# Patient Record
Sex: FEMALE | Race: WHITE | NOT HISPANIC OR LATINO | URBAN - METROPOLITAN AREA
[De-identification: names, ages, dates, MRNs, and addresses within clinical notes are randomized per-mention and may not be internally consistent; named-entity substitution may affect disease eponyms.]

---

## 2017-09-22 ENCOUNTER — FOLLOW UP ESTABLISHED (OUTPATIENT)
Dept: URBAN - METROPOLITAN AREA CLINIC 30 | Facility: CLINIC | Age: 67
End: 2017-09-22
Payer: MEDICARE

## 2017-09-22 PROCEDURE — 92134 CPTRZ OPH DX IMG PST SGM RTA: CPT | Performed by: OPTOMETRIST

## 2017-09-22 PROCEDURE — 92014 COMPRE OPH EXAM EST PT 1/>: CPT | Performed by: OPTOMETRIST

## 2017-09-22 ASSESSMENT — INTRAOCULAR PRESSURE
OD: 16
OS: 16

## 2017-09-22 ASSESSMENT — KERATOMETRY
OD: 25.05
OS: 42.92

## 2017-09-22 ASSESSMENT — VISUAL ACUITY
OS: 20/25
OD: 20/40

## 2018-11-28 ENCOUNTER — RX CHECK (OUTPATIENT)
Dept: URBAN - METROPOLITAN AREA CLINIC 24 | Facility: CLINIC | Age: 68
End: 2018-11-28
Payer: MEDICARE

## 2018-11-28 DIAGNOSIS — H04.123 DRY EYE SYNDROME OF BILATERAL LACRIMAL GLANDS: ICD-10-CM

## 2018-11-28 PROCEDURE — 92004 COMPRE OPH EXAM NEW PT 1/>: CPT | Performed by: OPHTHALMOLOGY

## 2018-11-28 PROCEDURE — 92014 COMPRE OPH EXAM EST PT 1/>: CPT | Performed by: OPHTHALMOLOGY

## 2018-11-28 ASSESSMENT — VISUAL ACUITY
OD: 20/25
OS: 20/20

## 2018-11-28 ASSESSMENT — INTRAOCULAR PRESSURE
OS: 16
OD: 14

## 2018-11-28 ASSESSMENT — KERATOMETRY
OS: 42.98
OD: 44.30

## 2020-05-19 ENCOUNTER — FOLLOW UP ESTABLISHED (OUTPATIENT)
Dept: URBAN - METROPOLITAN AREA CLINIC 24 | Facility: CLINIC | Age: 70
End: 2020-05-19
Payer: MEDICARE

## 2020-05-19 DIAGNOSIS — Z98.890 OTHER SPECIFIED POSTPROCEDURAL STATES: ICD-10-CM

## 2020-05-19 PROCEDURE — 92134 CPTRZ OPH DX IMG PST SGM RTA: CPT | Performed by: OPTOMETRIST

## 2020-05-19 PROCEDURE — 92014 COMPRE OPH EXAM EST PT 1/>: CPT | Performed by: OPTOMETRIST

## 2020-05-19 ASSESSMENT — VISUAL ACUITY
OS: 20/20
OD: 20/30

## 2020-05-19 ASSESSMENT — KERATOMETRY
OD: 44.62
OS: 42.60

## 2020-05-19 ASSESSMENT — INTRAOCULAR PRESSURE
OS: 15
OD: 13

## 2020-06-01 ENCOUNTER — Encounter (OUTPATIENT)
Dept: URBAN - METROPOLITAN AREA CLINIC 24 | Facility: CLINIC | Age: 70
End: 2020-06-01
Payer: MEDICARE

## 2020-06-01 DIAGNOSIS — Z01.818 ENCOUNTER FOR OTHER PREPROCEDURAL EXAMINATION: Primary | ICD-10-CM

## 2020-06-01 PROCEDURE — 99213 OFFICE O/P EST LOW 20 MIN: CPT | Performed by: PHYSICIAN ASSISTANT

## 2020-06-01 RX ORDER — LEVOTHYROXINE SODIUM 88 UG/1
TABLET ORAL
Qty: 0 | Refills: 0 | Status: INACTIVE
Start: 2020-06-01 | End: 2021-11-04

## 2020-06-08 ENCOUNTER — NEW PATIENT (OUTPATIENT)
Dept: URBAN - METROPOLITAN AREA CLINIC 24 | Facility: CLINIC | Age: 70
End: 2020-06-08
Payer: MEDICARE

## 2020-06-08 DIAGNOSIS — H52.223 REGULAR ASTIGMATISM, BILATERAL: ICD-10-CM

## 2020-06-08 PROCEDURE — 92012 INTRM OPH EXAM EST PATIENT: CPT | Performed by: OPHTHALMOLOGY

## 2020-06-08 PROCEDURE — 92002 INTRM OPH EXAM NEW PATIENT: CPT | Performed by: OPHTHALMOLOGY

## 2020-06-15 ENCOUNTER — SURGERY (OUTPATIENT)
Dept: URBAN - METROPOLITAN AREA SURGERY 12 | Facility: SURGERY | Age: 70
End: 2020-06-15
Payer: MEDICARE

## 2020-06-15 PROCEDURE — 66984 XCAPSL CTRC RMVL W/O ECP: CPT | Performed by: OPHTHALMOLOGY

## 2020-06-16 ENCOUNTER — POST OP (OUTPATIENT)
Dept: URBAN - METROPOLITAN AREA CLINIC 24 | Facility: CLINIC | Age: 70
End: 2020-06-16

## 2020-06-16 PROCEDURE — 99024 POSTOP FOLLOW-UP VISIT: CPT | Performed by: OPTOMETRIST

## 2020-06-16 ASSESSMENT — INTRAOCULAR PRESSURE: OD: 12

## 2020-06-22 ENCOUNTER — POST OP (OUTPATIENT)
Dept: URBAN - METROPOLITAN AREA CLINIC 24 | Facility: CLINIC | Age: 70
End: 2020-06-22

## 2020-06-22 ENCOUNTER — Encounter (OUTPATIENT)
Dept: URBAN - METROPOLITAN AREA CLINIC 24 | Facility: CLINIC | Age: 70
End: 2020-06-22
Payer: MEDICARE

## 2020-06-22 DIAGNOSIS — H25.813 COMBINED FORMS OF AGE-RELATED CATARACT, BILATERAL: ICD-10-CM

## 2020-06-22 PROCEDURE — 99024 POSTOP FOLLOW-UP VISIT: CPT | Performed by: OPTOMETRIST

## 2020-06-22 PROCEDURE — 99213 OFFICE O/P EST LOW 20 MIN: CPT | Performed by: PHYSICIAN ASSISTANT

## 2020-06-22 ASSESSMENT — INTRAOCULAR PRESSURE
OS: 15
OS: 15
OD: 15
OD: 15

## 2020-06-22 ASSESSMENT — VISUAL ACUITY
OS: 20/20
OD: 20/20

## 2020-07-13 ENCOUNTER — SURGERY (OUTPATIENT)
Dept: URBAN - METROPOLITAN AREA SURGERY 12 | Facility: SURGERY | Age: 70
End: 2020-07-13
Payer: MEDICARE

## 2020-07-13 PROCEDURE — 66984 XCAPSL CTRC RMVL W/O ECP: CPT | Performed by: OPHTHALMOLOGY

## 2020-07-14 ENCOUNTER — POST OP (OUTPATIENT)
Dept: URBAN - METROPOLITAN AREA CLINIC 24 | Facility: CLINIC | Age: 70
End: 2020-07-14

## 2020-07-14 PROCEDURE — 99024 POSTOP FOLLOW-UP VISIT: CPT | Performed by: OPTOMETRIST

## 2020-07-14 ASSESSMENT — INTRAOCULAR PRESSURE: OS: 13

## 2020-07-22 ENCOUNTER — POST OP (OUTPATIENT)
Dept: URBAN - METROPOLITAN AREA CLINIC 24 | Facility: CLINIC | Age: 70
End: 2020-07-22
Payer: MEDICARE

## 2020-07-22 PROCEDURE — 99024 POSTOP FOLLOW-UP VISIT: CPT | Performed by: OPTOMETRIST

## 2020-07-22 ASSESSMENT — VISUAL ACUITY: OS: 20/20

## 2020-07-22 ASSESSMENT — INTRAOCULAR PRESSURE
OD: 15
OS: 13

## 2020-08-03 ENCOUNTER — POST OP (OUTPATIENT)
Dept: URBAN - METROPOLITAN AREA CLINIC 24 | Facility: CLINIC | Age: 70
End: 2020-08-03
Payer: MEDICARE

## 2020-08-03 DIAGNOSIS — Z96.1 PRESENCE OF INTRAOCULAR LENS: Primary | ICD-10-CM

## 2020-08-03 PROCEDURE — 99024 POSTOP FOLLOW-UP VISIT: CPT | Performed by: OPTOMETRIST

## 2020-08-03 ASSESSMENT — VISUAL ACUITY
OS: 20/20
OD: 20/20

## 2020-08-03 ASSESSMENT — INTRAOCULAR PRESSURE
OS: 13
OD: 14

## 2021-09-02 ENCOUNTER — OFFICE VISIT (OUTPATIENT)
Dept: URBAN - METROPOLITAN AREA CLINIC 24 | Facility: CLINIC | Age: 71
End: 2021-09-02
Payer: MEDICARE

## 2021-09-02 DIAGNOSIS — H02.831 DERMATOCHALASIS OF RIGHT UPPER EYELID: ICD-10-CM

## 2021-09-02 DIAGNOSIS — H02.834 DERMATOCHALASIS OF LEFT UPPER EYELID: ICD-10-CM

## 2021-09-02 DIAGNOSIS — H43.813 VITREOUS DEGENERATION, BILATERAL: Primary | ICD-10-CM

## 2021-09-02 DIAGNOSIS — H26.491 OTHER SECONDARY CATARACT, RIGHT EYE: ICD-10-CM

## 2021-09-02 PROCEDURE — 99214 OFFICE O/P EST MOD 30 MIN: CPT | Performed by: OPTOMETRIST

## 2021-09-02 PROCEDURE — 92134 CPTRZ OPH DX IMG PST SGM RTA: CPT | Performed by: OPTOMETRIST

## 2021-09-02 ASSESSMENT — INTRAOCULAR PRESSURE
OD: 14
OS: 13

## 2021-09-02 ASSESSMENT — VISUAL ACUITY
OS: 20/25
OD: 20/25

## 2021-09-02 ASSESSMENT — KERATOMETRY
OS: 42.84
OD: 44.82

## 2021-09-02 NOTE — IMPRESSION/PLAN
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 Plan: Appears significant, pt symptomatic. Will arrange ocplx consult.

## 2021-09-02 NOTE — IMPRESSION/PLAN
Impression: Other secondary cataract, right eye: H26.491.
-- s/p monivision IOL Plan: Opacified capsule not affecting vision. No indication for treatment. Return if decreased vision.

## 2021-09-02 NOTE — IMPRESSION/PLAN
Impression: Dermatochalasis of right upper eyelid: H02.831. Plan: Appears significant, pt symptomatic. Will arrange ocplx consult.

## 2021-10-07 ENCOUNTER — OFFICE VISIT (OUTPATIENT)
Dept: URBAN - METROPOLITAN AREA CLINIC 24 | Facility: CLINIC | Age: 71
End: 2021-10-07
Payer: MEDICARE

## 2021-10-07 DIAGNOSIS — H02.423 MYOGENIC PTOSIS OF BILATERAL EYELIDS: Primary | ICD-10-CM

## 2021-10-07 PROCEDURE — 92082 INTERMEDIATE VISUAL FIELD XM: CPT | Performed by: OPHTHALMOLOGY

## 2021-10-07 PROCEDURE — 92285 EXTERNAL OCULAR PHOTOGRAPHY: CPT | Performed by: OPHTHALMOLOGY

## 2021-10-07 PROCEDURE — 99214 OFFICE O/P EST MOD 30 MIN: CPT | Performed by: OPHTHALMOLOGY

## 2021-10-07 RX ORDER — NEOMYCIN SULFATE, POLYMYXIN B SULFATE AND DEXAMETHASONE 3.5; 10000; 1 MG/G; [USP'U]/G; MG/G
OINTMENT OPHTHALMIC
Qty: 1 | Refills: 1 | Status: ACTIVE
Start: 2021-10-07

## 2021-11-04 ENCOUNTER — ADULT PHYSICAL (OUTPATIENT)
Dept: URBAN - METROPOLITAN AREA CLINIC 24 | Facility: CLINIC | Age: 71
End: 2021-11-04
Payer: MEDICARE

## 2021-11-04 PROCEDURE — 99213 OFFICE O/P EST LOW 20 MIN: CPT | Performed by: PHYSICIAN ASSISTANT

## 2021-11-11 ENCOUNTER — SURGERY (OUTPATIENT)
Dept: URBAN - METROPOLITAN AREA SURGERY 12 | Facility: SURGERY | Age: 71
End: 2021-11-11
Payer: MEDICARE

## 2021-11-22 ENCOUNTER — POST-OPERATIVE VISIT (OUTPATIENT)
Dept: URBAN - METROPOLITAN AREA CLINIC 24 | Facility: CLINIC | Age: 71
End: 2021-11-22
Payer: MEDICARE

## 2021-11-22 DIAGNOSIS — Z48.89 ENCOUNTER FOR OTHER SPECIFIED SURGICAL AFTERCARE: Primary | ICD-10-CM

## 2021-11-22 PROCEDURE — 99024 POSTOP FOLLOW-UP VISIT: CPT | Performed by: OPTOMETRIST

## 2022-09-06 ENCOUNTER — OFFICE VISIT (OUTPATIENT)
Dept: URBAN - METROPOLITAN AREA CLINIC 24 | Facility: CLINIC | Age: 72
End: 2022-09-06
Payer: MEDICARE

## 2022-09-06 DIAGNOSIS — H43.813 VITREOUS DEGENERATION, BILATERAL: Primary | ICD-10-CM

## 2022-09-06 DIAGNOSIS — Z98.890 OTHER SPECIFIED POSTPROCEDURAL STATES: ICD-10-CM

## 2022-09-06 DIAGNOSIS — H26.491 OTHER SECONDARY CATARACT, RIGHT EYE: ICD-10-CM

## 2022-09-06 DIAGNOSIS — H16.143 PUNCTATE KERATITIS, BILATERAL: ICD-10-CM

## 2022-09-06 PROCEDURE — 92014 COMPRE OPH EXAM EST PT 1/>: CPT | Performed by: OPTOMETRIST

## 2022-09-06 ASSESSMENT — INTRAOCULAR PRESSURE
OD: 10
OS: 10

## 2022-09-06 ASSESSMENT — KERATOMETRY
OD: 44.27
OS: 42.90

## 2022-09-06 ASSESSMENT — VISUAL ACUITY
OD: 20/20
OS: 20/20

## 2023-04-03 NOTE — IMPRESSION/PLAN
Impression: Myogenic ptosis of bilateral eyelids: H02.423. Plan: [place] screen VF & photos were performed and interpreted today and demonstrated restriction of superior and temporal visual fields. This reverted to normal demonstrating >30% increase in visual field with mechanical elevation of the eyelid and brow. The patient's eyelids did not adequately respond to phenylephrine testing, indicating that the degree of ptosis will require an external levator resection to correct the eyelid ptosis. Risks, benefits, and alternatives (including no surgery) discussed with patient. Left voicemail for patient to call the office back.    Will send portal message.

## 2024-01-11 ENCOUNTER — OFFICE VISIT (OUTPATIENT)
Dept: URBAN - METROPOLITAN AREA CLINIC 24 | Facility: CLINIC | Age: 74
End: 2024-01-11
Payer: MEDICARE

## 2024-01-11 DIAGNOSIS — H26.491 OTHER SECONDARY CATARACT, RIGHT EYE: ICD-10-CM

## 2024-01-11 DIAGNOSIS — H43.813 VITREOUS DEGENERATION, BILATERAL: Primary | ICD-10-CM

## 2024-01-11 DIAGNOSIS — Z98.890 OTHER SPECIFIED POSTPROCEDURAL STATES: ICD-10-CM

## 2024-01-11 DIAGNOSIS — H16.143 PUNCTATE KERATITIS, BILATERAL: ICD-10-CM

## 2024-01-11 PROCEDURE — 92014 COMPRE OPH EXAM EST PT 1/>: CPT | Performed by: OPTOMETRIST

## 2024-01-11 ASSESSMENT — KERATOMETRY
OS: 42.63
OD: 43.50

## 2024-01-11 ASSESSMENT — VISUAL ACUITY
OD: 20/25
OS: 20/20

## 2024-01-11 ASSESSMENT — INTRAOCULAR PRESSURE
OS: 15
OD: 15

## 2025-02-25 ENCOUNTER — OFFICE VISIT (OUTPATIENT)
Dept: URBAN - METROPOLITAN AREA CLINIC 24 | Facility: CLINIC | Age: 75
End: 2025-02-25
Payer: MEDICARE

## 2025-02-25 DIAGNOSIS — H26.491 OTHER SECONDARY CATARACT, RIGHT EYE: Primary | ICD-10-CM

## 2025-02-25 DIAGNOSIS — H43.813 VITREOUS DEGENERATION, BILATERAL: ICD-10-CM

## 2025-02-25 DIAGNOSIS — H16.143 PUNCTATE KERATITIS, BILATERAL: ICD-10-CM

## 2025-02-25 DIAGNOSIS — Z98.890 OTHER SPECIFIED POSTPROCEDURAL STATES: ICD-10-CM

## 2025-02-25 PROCEDURE — 92134 CPTRZ OPH DX IMG PST SGM RTA: CPT | Performed by: OPTOMETRIST

## 2025-02-25 PROCEDURE — 92014 COMPRE OPH EXAM EST PT 1/>: CPT | Performed by: OPTOMETRIST

## 2025-02-25 ASSESSMENT — INTRAOCULAR PRESSURE
OD: 14
OS: 14

## 2025-02-25 ASSESSMENT — VISUAL ACUITY
OD: 20/30
OS: 20/20